# Patient Record
(demographics unavailable — no encounter records)

---

## 2024-12-03 NOTE — HISTORY OF PRESENT ILLNESS
[GI Symptoms] : GI SYMPTOMS [___ Day(s)] : [unfilled] day(s) [Intermittent] : intermittent [# of episodes of diarrhea: ___] : Number of episodes of diarrhea: [unfilled] [Last episode: ___] : Last episode: [unfilled] [URI symptoms] : URI symptoms [Decreased Appetite] : decreased appetite [Diarrhea] : diarrhea [Abdominal Pain] : abdominal pain [Worsening] : worsening [Sick Contacts: ___] : sick contacts: [unfilled] [At Night] : at night [Fever] : no fever [Reduced # of voids] : no reduced amount of voids [Nonprojectile vomiting] : no nonprojectile vomiting [Projectile vomiting] : no projectile vomiting [Constipation] : no constipation [Bloody Stool] : no bloody stool [Gassiness] : no gassiness [Rash] : no rash

## 2024-12-03 NOTE — DISCUSSION/SUMMARY
[FreeTextEntry1] : Patient has symptoms of gastroenteritis. In order to maintain hydration consume "oral rehydration solution," such as Pedialyte or low calorie sports drinks. If vomiting, try to give child a few teaspoons of fluid every few minutes. Avoid drinking juice or soda. These can make diarrhea worse. If tolerating solids, its best to consume lean meats, fruits, vegetables, and whole-grain breads and cereals. Avoid eating foods with a lot of fat or sugar, which can make symptoms worse. RTC for worsening or persistent symptoms.

## 2025-04-23 NOTE — HISTORY OF PRESENT ILLNESS
[Mother] : mother [1% ___ oz/d] : consumes [unfilled] oz of 1% cow's milk per day [Sugar drinks] : sugar drinks [Fruit] : fruit [Vegetables] : vegetables [Meat] : meat [Eggs] : eggs [Fish] : fish [Dairy] : dairy [___ stools per day] : [unfilled]  stools per day [___ voids per day] : [unfilled] voids per day [Wakes up at night] : Wakes up at night [Bottle Use] : Bottle use [Brushing teeth] : Brushing teeth [Toothpaste] : Primary Fluoride Source: Toothpaste [Playtime (60 min/d)] : Playtime 60 min a day [< 2 hrs of screen time] : Less than 2 hrs of screen time [Appropiate parent-child communication] : Appropriate parent-child communication [Child given choices] : Child given choices [Child Cooperates] : Child cooperates [Parent has appropriate responses to behavior] : Parent has appropriate responses to behavior [No] : Not at  exposure [Water heater temperature set at <120 degrees F] : Water heater temperature set at <120 degrees F [Car seat in back seat] : Car seat in back seat [Smoke Detectors] : Smoke detectors [Supervised play near cars and streets] : Supervised play near cars and streets [Carbon Monoxide Detectors] : Carbon monoxide detectors [Normal] : Normal [Varicella] : Varicella [Influenza] : Influenza [COVID-19] : COVID-19 [MMR] : MMR [Exposure to electronic nicotine delivery system] : No exposure to electronic nicotine delivery system [de-identified] : allergies (see narrative below) [FreeTextEntry1] : --- Allergies: after going to park a few days ago has had itchy scratchy eyes in the setting of continuous runny/stuffy nose, chronic mouth breathing. Has been giving OTC Zyrtec with slight improvement.

## 2025-04-23 NOTE — DEVELOPMENTAL MILESTONES
[Normal Development] : Normal Development [None] : none [Plays and shares with others] : plays and shares with others [Put on coat, jacket, or shirt by self] : puts on coat, jacket, or shirt by self [Begins to play make-believe] : begins to play make-believe [Eats independently] : eats independently [Uses 3-word sentences] : uses 3-word sentences [Uses words that are 75% intelligible] : uses words that are 75% intelligible to strangers [Understands simple prepositions] : understands simple prepositions [Tells a story from a book or TV] : tells a story from a book or TV [Compares things using words such] : compares things using words such as bigger or shorter [Pedals tricycle] : pedals tricycle [Climbs on and off couch] : climbs on and off couch or chair [Jumps forward] : jumps forward [Draws a single Stebbins] : draws a single Stebbins [Draws a person with head] : draws a person with head and one other body part [Cuts with child scissor] : cuts with child scissor

## 2025-04-23 NOTE — DISCUSSION/SUMMARY
[Normal Growth] : growth [Normal Development] : development [No Elimination Concerns] : elimination [Normal Sleep Pattern] : sleep [Family Support] : family support [Encouraging Literacy Activities] : encouraging literacy activities [Playing with Peers] : playing with peers [Promoting Physical Activity] : promoting physical activity [Safety] : safety [] : The components of the vaccine(s) to be administered today are listed in the plan of care. The disease(s) for which the vaccine(s) are intended to prevent and the risks have been discussed with the caretaker.  The risks are also included in the appropriate vaccination information statements which have been provided to the patient's caregiver.  The caregiver has given consent to vaccinate. [FreeTextEntry2] : grandmother [de-identified] : ENT [FreeTextEntry1] : --- 3 year old male here for well child check with age-appropriate development. BMI >99th percentile (though consistent), likely due to excess caloric intake. Hearing screen ([facility]) was within normal limits. Vision screen ([facility]) was within normal limits. Lead and hemoglobin screenings to be done today.   - Allergic conjunctivitis/rhinitis, chronic mouth-breathing, tonsillar hypertrophy, post-nasal drip: Discussed pursuing ENT consultation. Discussed OTC antihistamines (oral, ocular, intranasal). Discussed allergen reduction strategies. - Referred to ENT for further evaluation/management. Help is appreciated.  - Delayed Immunizations: Mother verbally consented to DTaP/HiB/IPV, Hep B, Hep A administration today. Still needs MMR #1, VZV #1. Discussed increased risk of significant disease/death without vaccination. Legal guardian not present to sign "Deferral/Refusal to Vaccinate" form.  - Provided age-appropriate anticipatory guidance, including, but not limited to reducing daily total volume of milk and healthy nutritional choices. Discussed need for dental home. - Return for 4 old well check, or sooner if concerns arise.

## 2025-04-23 NOTE — PHYSICAL EXAM
[Alert] : alert [No Acute Distress] : no acute distress [Playful] : playful [Normocephalic] : normocephalic [EOMI Bilateral] : EOMI bilateral [Auricles Well Formed] : auricles well formed [Clear Tympanic membranes with present light reflex and bony landmarks] : clear tympanic membranes with present light reflex and bony landmarks [Nares Patent] : nares patent [Pink Nasal Mucosa] : pink nasal mucosa [Palate Intact] : palate intact [Supple, full passive range of motion] : supple, full passive range of motion [No Palpable Masses] : no palpable masses [Clear to Auscultation Bilaterally] : clear to auscultation bilaterally [Regular Rate and Rhythm] : regular rate and rhythm [Normal S1, S2 present] : normal S1, S2 present [No Murmurs] : no murmurs [Soft] : soft [Non Distended] : non distended [Jarrett 1] : Jarrett 1 [Circumcised] : circumcised [Testicles Descended Bilaterally] : testicles descended bilaterally [No Abnormal Lymph Nodes Palpated] : no abnormal lymph nodes palpated [Negative Allis Sign] : negative Allis sign [No pain or deformities with palpation of bone, muscles, joints] : no pain or deformities with palpation of bone, muscles, joints [Straight] : straight [Cranial Nerves Grossly Intact] : cranial nerves grossly intact [No Rash or Lesions] : no rash or lesions [FreeTextEntry5] : mild conjunctival injection without discharge [FreeTextEntry4] : hypertrophied turbinates [de-identified] : hypertrophied tonsils

## 2025-04-28 NOTE — DISCUSSION/SUMMARY
[FreeTextEntry1] : Recommend supportive care with warm compresses and application of antibiotic eye drops if prescribed. Return if symptoms worsen.

## 2025-04-28 NOTE — HISTORY OF PRESENT ILLNESS
[EENT/Resp Symptoms] : EENT/RESPIRATORY SYMPTOMS [Eye discharge] : eye discharge [Eye redness] : eye redness [___ Day(s)] : [unfilled] day(s) [Intermittent] : intermittent [Active] : active [Fever] : no fever [Diarrhea] : no diarrhea